# Patient Record
Sex: FEMALE | Race: WHITE
[De-identification: names, ages, dates, MRNs, and addresses within clinical notes are randomized per-mention and may not be internally consistent; named-entity substitution may affect disease eponyms.]

---

## 2018-06-11 ENCOUNTER — HOSPITAL ENCOUNTER (INPATIENT)
Dept: HOSPITAL 44 - SOUTH | Age: 83
LOS: 16 days | Discharge: HOME HEALTH | DRG: 566 | End: 2018-06-27
Attending: FAMILY MEDICINE | Admitting: FAMILY MEDICINE
Payer: MEDICARE

## 2018-06-11 VITALS — BODY MASS INDEX: 26.4 KG/M2

## 2018-06-11 DIAGNOSIS — I10: ICD-10-CM

## 2018-06-11 DIAGNOSIS — Z96.612: Primary | ICD-10-CM

## 2018-06-11 DIAGNOSIS — K21.9: ICD-10-CM

## 2018-06-11 PROCEDURE — 80053 COMPREHEN METABOLIC PANEL: CPT

## 2018-06-11 PROCEDURE — 36415 COLL VENOUS BLD VENIPUNCTURE: CPT

## 2018-06-11 PROCEDURE — 85025 COMPLETE CBC W/AUTO DIFF WBC: CPT

## 2018-06-11 RX ADMIN — FAMOTIDINE SCH MG: 20 TABLET, FILM COATED ORAL at 16:51

## 2018-06-11 RX ADMIN — PANTOPRAZOLE SODIUM SCH MG: 40 TABLET, DELAYED RELEASE ORAL at 16:51

## 2018-06-11 RX ADMIN — Medication SCH EACH: at 20:03

## 2018-06-11 RX ADMIN — GABAPENTIN SCH MG: 300 CAPSULE ORAL at 20:03

## 2018-06-12 LAB
BASOPHILS NFR BLD: 0.1 % (ref 0–1.5)
EGFR (AFRICAN): > 60
EGFR (NON-AFRICAN): > 60
EOSINOPHIL NFR BLD: 3.9 % (ref 0–6.8)
MCH RBC QN AUTO: 29 PG (ref 28–34)
MCV RBC AUTO: 90.4 FL (ref 80–100)
MONOCYTES %: 9.8 % (ref 0–11)
NEUTROPHILS #: 4.6 # K/UL (ref 1.4–7.7)

## 2018-06-12 RX ADMIN — PANTOPRAZOLE SODIUM SCH MG: 40 TABLET, DELAYED RELEASE ORAL at 05:51

## 2018-06-12 RX ADMIN — MULTIVITAMIN TABLET SCH EACH: TABLET at 09:27

## 2018-06-12 RX ADMIN — GABAPENTIN SCH MG: 300 CAPSULE ORAL at 20:00

## 2018-06-12 RX ADMIN — ALENDRONATE SODIUM SCH MG: 70 TABLET ORAL at 05:51

## 2018-06-12 RX ADMIN — LOSARTAN POTASSIUM SCH MG: 50 TABLET, FILM COATED ORAL at 09:26

## 2018-06-12 RX ADMIN — LORATADINE SCH MG: 10 TABLET ORAL at 09:26

## 2018-06-12 RX ADMIN — FAMOTIDINE SCH MG: 20 TABLET, FILM COATED ORAL at 05:51

## 2018-06-12 RX ADMIN — Medication SCH EACH: at 20:00

## 2018-06-12 RX ADMIN — Medication SCH EACH: at 09:27

## 2018-06-12 RX ADMIN — MIRABEGRON SCH MG: 25 TABLET, FILM COATED, EXTENDED RELEASE ORAL at 18:05

## 2018-06-12 RX ADMIN — ALBUTEROL SULFATE PRN PUFF: 90 AEROSOL, METERED RESPIRATORY (INHALATION) at 18:09

## 2018-06-12 RX ADMIN — ACETAMINOPHEN PRN MG: 325 TABLET, FILM COATED ORAL at 18:07

## 2018-06-13 RX ADMIN — LORATADINE SCH MG: 10 TABLET ORAL at 08:29

## 2018-06-13 RX ADMIN — Medication SCH EACH: at 08:30

## 2018-06-13 RX ADMIN — MIRABEGRON SCH MG: 25 TABLET, FILM COATED, EXTENDED RELEASE ORAL at 17:44

## 2018-06-13 RX ADMIN — PANTOPRAZOLE SODIUM SCH MG: 40 TABLET, DELAYED RELEASE ORAL at 05:41

## 2018-06-13 RX ADMIN — Medication SCH EACH: at 20:40

## 2018-06-13 RX ADMIN — GABAPENTIN SCH MG: 300 CAPSULE ORAL at 20:37

## 2018-06-13 RX ADMIN — MULTIVITAMIN TABLET SCH EACH: TABLET at 08:30

## 2018-06-13 RX ADMIN — FAMOTIDINE SCH MG: 20 TABLET, FILM COATED ORAL at 05:41

## 2018-06-13 RX ADMIN — LOSARTAN POTASSIUM SCH MG: 50 TABLET, FILM COATED ORAL at 08:28

## 2018-06-14 RX ADMIN — PANTOPRAZOLE SODIUM SCH MG: 40 TABLET, DELAYED RELEASE ORAL at 06:04

## 2018-06-14 RX ADMIN — GABAPENTIN SCH MG: 300 CAPSULE ORAL at 20:05

## 2018-06-14 RX ADMIN — Medication SCH EACH: at 20:21

## 2018-06-14 RX ADMIN — MIRABEGRON SCH MG: 25 TABLET, FILM COATED, EXTENDED RELEASE ORAL at 17:14

## 2018-06-14 RX ADMIN — FAMOTIDINE SCH MG: 20 TABLET, FILM COATED ORAL at 06:04

## 2018-06-14 RX ADMIN — Medication SCH EACH: at 08:44

## 2018-06-14 RX ADMIN — LORATADINE SCH MG: 10 TABLET ORAL at 17:47

## 2018-06-14 RX ADMIN — Medication SCH EACH: at 20:07

## 2018-06-14 RX ADMIN — MULTIVITAMIN TABLET SCH EACH: TABLET at 08:44

## 2018-06-14 RX ADMIN — LOSARTAN POTASSIUM SCH MG: 50 TABLET, FILM COATED ORAL at 08:42

## 2018-06-15 RX ADMIN — FAMOTIDINE SCH MG: 20 TABLET, FILM COATED ORAL at 06:07

## 2018-06-15 RX ADMIN — Medication SCH EACH: at 09:42

## 2018-06-15 RX ADMIN — MIRABEGRON SCH MG: 25 TABLET, FILM COATED, EXTENDED RELEASE ORAL at 18:01

## 2018-06-15 RX ADMIN — GABAPENTIN SCH MG: 300 CAPSULE ORAL at 20:14

## 2018-06-15 RX ADMIN — Medication SCH EACH: at 20:15

## 2018-06-15 RX ADMIN — MULTIVITAMIN TABLET SCH EACH: TABLET at 09:42

## 2018-06-15 RX ADMIN — PANTOPRAZOLE SODIUM SCH MG: 40 TABLET, DELAYED RELEASE ORAL at 06:07

## 2018-06-15 RX ADMIN — LOSARTAN POTASSIUM SCH MG: 50 TABLET, FILM COATED ORAL at 09:42

## 2018-06-15 RX ADMIN — LORATADINE SCH MG: 10 TABLET ORAL at 09:42

## 2018-06-16 RX ADMIN — LORATADINE SCH MG: 10 TABLET ORAL at 09:08

## 2018-06-16 RX ADMIN — FAMOTIDINE SCH MG: 20 TABLET, FILM COATED ORAL at 06:31

## 2018-06-16 RX ADMIN — MIRABEGRON SCH MG: 25 TABLET, FILM COATED, EXTENDED RELEASE ORAL at 17:01

## 2018-06-16 RX ADMIN — PANTOPRAZOLE SODIUM SCH MG: 40 TABLET, DELAYED RELEASE ORAL at 06:31

## 2018-06-16 RX ADMIN — Medication SCH EACH: at 09:09

## 2018-06-16 RX ADMIN — LOSARTAN POTASSIUM SCH MG: 50 TABLET, FILM COATED ORAL at 09:09

## 2018-06-16 RX ADMIN — MULTIVITAMIN TABLET SCH EACH: TABLET at 09:10

## 2018-06-18 RX ADMIN — LOSARTAN POTASSIUM SCH MG: 50 TABLET, FILM COATED ORAL at 11:26

## 2018-06-18 RX ADMIN — MULTIVITAMIN TABLET SCH EACH: TABLET at 11:26

## 2018-06-18 RX ADMIN — FAMOTIDINE SCH MG: 20 TABLET, FILM COATED ORAL at 11:27

## 2018-06-18 RX ADMIN — Medication SCH EACH: at 11:26

## 2018-06-18 RX ADMIN — LORATADINE SCH MG: 10 TABLET ORAL at 11:26

## 2018-06-18 RX ADMIN — GABAPENTIN SCH MG: 300 CAPSULE ORAL at 20:59

## 2018-06-18 RX ADMIN — GABAPENTIN SCH MG: 300 CAPSULE ORAL at 11:28

## 2018-06-18 RX ADMIN — ALENDRONATE SODIUM SCH MG: 70 TABLET ORAL at 11:23

## 2018-06-18 RX ADMIN — Medication SCH EACH: at 20:59

## 2018-06-18 RX ADMIN — GABAPENTIN SCH MG: 300 CAPSULE ORAL at 11:26

## 2018-06-18 RX ADMIN — PANTOPRAZOLE SODIUM SCH MG: 40 TABLET, DELAYED RELEASE ORAL at 11:27

## 2018-06-18 RX ADMIN — MIRABEGRON SCH MG: 25 TABLET, FILM COATED, EXTENDED RELEASE ORAL at 17:35

## 2018-06-18 RX ADMIN — MIRABEGRON SCH MG: 25 TABLET, FILM COATED, EXTENDED RELEASE ORAL at 11:26

## 2018-06-18 RX ADMIN — Medication SCH EACH: at 11:28

## 2018-06-19 RX ADMIN — LORATADINE SCH MG: 10 TABLET ORAL at 08:41

## 2018-06-19 RX ADMIN — GABAPENTIN SCH MG: 300 CAPSULE ORAL at 21:18

## 2018-06-19 RX ADMIN — PANTOPRAZOLE SODIUM SCH MG: 40 TABLET, DELAYED RELEASE ORAL at 06:30

## 2018-06-19 RX ADMIN — Medication SCH EACH: at 08:41

## 2018-06-19 RX ADMIN — MULTIVITAMIN TABLET SCH EACH: TABLET at 08:41

## 2018-06-19 RX ADMIN — ACETAMINOPHEN PRN MG: 325 TABLET, FILM COATED ORAL at 09:54

## 2018-06-19 RX ADMIN — Medication SCH EACH: at 21:18

## 2018-06-19 RX ADMIN — MIRABEGRON SCH MG: 25 TABLET, FILM COATED, EXTENDED RELEASE ORAL at 17:26

## 2018-06-19 RX ADMIN — LOSARTAN POTASSIUM SCH MG: 50 TABLET, FILM COATED ORAL at 08:41

## 2018-06-19 RX ADMIN — ACETAMINOPHEN PRN MG: 325 TABLET, FILM COATED ORAL at 21:18

## 2018-06-19 RX ADMIN — FAMOTIDINE SCH MG: 20 TABLET, FILM COATED ORAL at 06:30

## 2018-06-20 RX ADMIN — MIRABEGRON SCH MG: 25 TABLET, FILM COATED, EXTENDED RELEASE ORAL at 17:47

## 2018-06-20 RX ADMIN — LORATADINE SCH MG: 10 TABLET ORAL at 08:15

## 2018-06-20 RX ADMIN — Medication SCH EACH: at 08:15

## 2018-06-20 RX ADMIN — GABAPENTIN SCH MG: 300 CAPSULE ORAL at 20:15

## 2018-06-20 RX ADMIN — Medication SCH EACH: at 20:20

## 2018-06-20 RX ADMIN — PANTOPRAZOLE SODIUM SCH MG: 40 TABLET, DELAYED RELEASE ORAL at 06:15

## 2018-06-20 RX ADMIN — MULTIVITAMIN TABLET SCH EACH: TABLET at 08:15

## 2018-06-20 RX ADMIN — FAMOTIDINE SCH MG: 20 TABLET, FILM COATED ORAL at 06:15

## 2018-06-20 RX ADMIN — LOSARTAN POTASSIUM SCH MG: 50 TABLET, FILM COATED ORAL at 08:15

## 2018-06-20 NOTE — INPATIENT PROGRESS NOTE
Subjective





- Required Recertification Statement


I anticipate X number of days because-include discharge plan: 7





- Review of Systems


Events since last encounter: 





Claudia says that her pain is well controlled, and she is doing well in therapy.  

She denies any new c/o.  Her arm is in it's immobilizer with cooling pack in 

place. 


General: Denies: Chills


HEENT: Denies: Head Aches


Pulmonary: Cough.  Denies: Dyspnea


Cardiovascular: Denies: Chest Pain


Gastrointestinal: Denies: Nausea, Vomiting


Genitourinary: Denies: Dysuria


Musculoskeletal: Shoulder Pain (improving).  Denies: Neck Pain


Neurological: Denies: Weakness





Objective





- Exam


Vitals and I&O: 


 Vital Signs











Temp  98 F   06/19/18 20:22


 


Pulse  91 H  06/19/18 20:22


 


Resp  16   06/19/18 20:22


 


BP  138/93   06/19/18 20:22


 


Pulse Ox  99   06/19/18 20:22











 Intake & Output











 06/19/18 06/19/18 06/20/18





 11:59 23:59 11:59


 


Intake Total 240 600 


 


Balance 240 600 


 


Intake:   


 


  Oral 240 600 


 


Other:   


 


  Voiding Method Toilet Toilet 


 


  # Bowel Movements 0 0 0














General: Alert, Oriented to Person, Oriented to Place, Oriented to Time


HEENT: Atraumatic, PERRLA, EOMI


Neck: Supple, No JVD, No thyromegaly


Lungs: Clear to auscultation


Cardiovascular: Regular rate


Abdomen: Normal bowel sounds


Extremities: No clubbing, No cyanosis, No edema, Other (left shoulder 

immobilizer is in place)


Skin: Normal, Pink, Warm, Dry


Neurological: Normal speech


Psych/Mental Status: Mental status NL





- Results


Results: 


 Laboratory Results











WBC  6.40 K/ul (4.00-12.00)   06/12/18  06:40    


 


RBC  4.05 M/ul (3.90-5.20)   06/12/18  06:40    


 


Hgb  11.8 g/dL (12.0-16.0)  L  06/12/18  06:40    


 


Hct  36.6 % (34.5-46.5)   06/12/18  06:40    


 


MCV  90.4 fl (80.0-100.0)   06/12/18  06:40    


 


MCH  29.0 pg (28.0-34.0)   06/12/18  06:40    


 


MCHC  32.1 g/dL (30.0-36.0)   06/12/18  06:40    


 


RDW  13.2 % (11.3-14.3)   06/12/18  06:40    


 


Plt Count  153 K/mm3 (130-400)   06/12/18  06:40    


 


Neut % (Auto)  71.2 % (39.0-79.0)   06/12/18  06:40    


 


Lymph % (Auto)  12.8 % (16.0-50.0)  L  06/12/18  06:40    


 


Mono % (Auto)  9.8 % (0.0-11.0)   06/12/18  06:40    


 


Eos % (Auto)  3.9 % (0.0-6.8)   06/12/18  06:40    


 


Baso % (Auto)  0.1  (0.0-1.5)   06/12/18  06:40    


 


Neut # (Auto)  4.6 # k/uL (1.4-7.7)   06/12/18  06:40    


 


Lymph # (Auto)  0.8 # k/uL (0.6-4.0)   06/12/18  06:40    


 


Mono # (Auto)  0.6 # k/uL (0.0-0.9)   06/12/18  06:40    


 


Eos # (Auto)  0.2 # k/uL (0.0-0.6)   06/12/18  06:40    


 


Baso # (Auto)  0.0 # k/uL (0.0-0.5)   06/12/18  06:40    


 


Reactive Lymphs %  2.1 % (0.0-5.0)   06/12/18  06:40    


 


Reactive Lymphs #  0.1 # k/uL (0.0-0.8)   06/12/18  06:40    


 


Sodium  137 mmol/L (136-145)   06/12/18  06:40    


 


Potassium  4.6 mmol/L (3.5-5.1)   06/12/18  06:40    


 


Chloride  104 mmol/L ()   06/12/18  06:40    


 


Carbon Dioxide  28 mmol/L (22-30)   06/12/18  06:40    


 


BUN  23 mg/dL (7-17)  H  06/12/18  06:40    


 


Creatinine  0.70 mg/dL (0.52-1.04)   06/12/18  06:40    


 


Estimated Creat Clear  71   06/12/18  06:40    


 


Est GFR ( Amer)  > 60  (60-)  06/12/18  06:40    


 


Est GFR (Non-Af Amer)  > 60  (60-)  06/12/18  06:40    


 


Glucose  98 mg/dL ()   06/12/18  06:40    


 


Calcium  9.3 mg/dL (8.4-10.2)   06/12/18  06:40    


 


Total Bilirubin  1.1 mg/dL (0.2-1.3)   06/12/18  06:40    


 


AST  21 U/L (15-46)   06/12/18  06:40    


 


ALT  24 U/L (13-69)   06/12/18  06:40    


 


Alkaline Phosphatase  81 U/L ()   06/12/18  06:40    


 


Total Protein  6.0 g/dL (6.3-8.2)  L  06/12/18  06:40    


 


Albumin  3.2 g/dL (3.5-5.0)  L  06/12/18  06:40    

















Assessment/Plan





- Assessment/Plan


(1) Left shoulder arthroplasty


Status: Acute   Current Visit: Yes   


Assessment: 


Healing well


Plan: 


Continue PT/OT








(2) GERD


Status: Acute   Current Visit: Yes

## 2018-06-21 RX ADMIN — GABAPENTIN SCH MG: 300 CAPSULE ORAL at 20:31

## 2018-06-21 RX ADMIN — FAMOTIDINE SCH MG: 20 TABLET, FILM COATED ORAL at 06:33

## 2018-06-21 RX ADMIN — Medication SCH EACH: at 08:42

## 2018-06-21 RX ADMIN — LORATADINE SCH MG: 10 TABLET ORAL at 08:42

## 2018-06-21 RX ADMIN — MIRABEGRON SCH MG: 25 TABLET, FILM COATED, EXTENDED RELEASE ORAL at 17:44

## 2018-06-21 RX ADMIN — MULTIVITAMIN TABLET SCH EACH: TABLET at 08:42

## 2018-06-21 RX ADMIN — LOSARTAN POTASSIUM SCH MG: 50 TABLET, FILM COATED ORAL at 08:42

## 2018-06-21 RX ADMIN — PANTOPRAZOLE SODIUM SCH MG: 40 TABLET, DELAYED RELEASE ORAL at 06:33

## 2018-06-21 RX ADMIN — Medication SCH EACH: at 20:31

## 2018-06-22 RX ADMIN — Medication SCH EACH: at 08:33

## 2018-06-22 RX ADMIN — LORATADINE SCH MG: 10 TABLET ORAL at 08:33

## 2018-06-22 RX ADMIN — MIRABEGRON SCH MG: 25 TABLET, FILM COATED, EXTENDED RELEASE ORAL at 17:35

## 2018-06-22 RX ADMIN — GABAPENTIN SCH MG: 300 CAPSULE ORAL at 20:16

## 2018-06-22 RX ADMIN — FAMOTIDINE SCH MG: 20 TABLET, FILM COATED ORAL at 06:51

## 2018-06-22 RX ADMIN — LOSARTAN POTASSIUM SCH MG: 50 TABLET, FILM COATED ORAL at 08:33

## 2018-06-22 RX ADMIN — PANTOPRAZOLE SODIUM SCH MG: 40 TABLET, DELAYED RELEASE ORAL at 06:51

## 2018-06-22 RX ADMIN — Medication SCH EACH: at 20:16

## 2018-06-22 RX ADMIN — MULTIVITAMIN TABLET SCH EACH: TABLET at 08:33

## 2018-06-23 RX ADMIN — Medication SCH EACH: at 08:57

## 2018-06-23 RX ADMIN — MULTIVITAMIN TABLET SCH EACH: TABLET at 08:57

## 2018-06-23 RX ADMIN — LORATADINE SCH MG: 10 TABLET ORAL at 08:57

## 2018-06-23 RX ADMIN — LOSARTAN POTASSIUM SCH MG: 50 TABLET, FILM COATED ORAL at 08:57

## 2018-06-23 RX ADMIN — FAMOTIDINE SCH MG: 20 TABLET, FILM COATED ORAL at 06:03

## 2018-06-23 RX ADMIN — ACETAMINOPHEN PRN MG: 325 TABLET, FILM COATED ORAL at 08:57

## 2018-06-23 RX ADMIN — GABAPENTIN SCH MG: 300 CAPSULE ORAL at 20:36

## 2018-06-23 RX ADMIN — MIRABEGRON SCH MG: 25 TABLET, FILM COATED, EXTENDED RELEASE ORAL at 17:57

## 2018-06-23 RX ADMIN — Medication SCH EACH: at 20:36

## 2018-06-23 RX ADMIN — PANTOPRAZOLE SODIUM SCH MG: 40 TABLET, DELAYED RELEASE ORAL at 06:03

## 2018-06-24 RX ADMIN — PANTOPRAZOLE SODIUM SCH MG: 40 TABLET, DELAYED RELEASE ORAL at 06:13

## 2018-06-24 RX ADMIN — Medication SCH EACH: at 21:30

## 2018-06-24 RX ADMIN — Medication SCH EACH: at 09:01

## 2018-06-24 RX ADMIN — LORATADINE SCH MG: 10 TABLET ORAL at 09:01

## 2018-06-24 RX ADMIN — MULTIVITAMIN TABLET SCH EACH: TABLET at 09:01

## 2018-06-24 RX ADMIN — GABAPENTIN SCH MG: 300 CAPSULE ORAL at 21:30

## 2018-06-24 RX ADMIN — MIRABEGRON SCH MG: 25 TABLET, FILM COATED, EXTENDED RELEASE ORAL at 17:41

## 2018-06-24 RX ADMIN — FAMOTIDINE SCH MG: 20 TABLET, FILM COATED ORAL at 06:13

## 2018-06-24 RX ADMIN — LOSARTAN POTASSIUM SCH MG: 50 TABLET, FILM COATED ORAL at 09:01

## 2018-06-25 RX ADMIN — ALENDRONATE SODIUM SCH MG: 70 TABLET ORAL at 05:30

## 2018-06-25 RX ADMIN — MIRABEGRON SCH MG: 25 TABLET, FILM COATED, EXTENDED RELEASE ORAL at 17:48

## 2018-06-25 RX ADMIN — Medication SCH EACH: at 20:43

## 2018-06-25 RX ADMIN — GABAPENTIN SCH MG: 300 CAPSULE ORAL at 20:43

## 2018-06-25 RX ADMIN — PANTOPRAZOLE SODIUM SCH MG: 40 TABLET, DELAYED RELEASE ORAL at 06:30

## 2018-06-25 RX ADMIN — LOSARTAN POTASSIUM SCH MG: 50 TABLET, FILM COATED ORAL at 09:32

## 2018-06-25 RX ADMIN — FAMOTIDINE SCH MG: 20 TABLET, FILM COATED ORAL at 06:30

## 2018-06-25 RX ADMIN — LORATADINE SCH MG: 10 TABLET ORAL at 09:32

## 2018-06-25 RX ADMIN — MULTIVITAMIN TABLET SCH EACH: TABLET at 09:32

## 2018-06-25 RX ADMIN — ALBUTEROL SULFATE PRN PUFF: 90 AEROSOL, METERED RESPIRATORY (INHALATION) at 09:48

## 2018-06-25 RX ADMIN — Medication SCH EACH: at 09:32

## 2018-06-26 RX ADMIN — PANTOPRAZOLE SODIUM SCH MG: 40 TABLET, DELAYED RELEASE ORAL at 05:43

## 2018-06-26 RX ADMIN — LORATADINE SCH MG: 10 TABLET ORAL at 08:19

## 2018-06-26 RX ADMIN — MULTIVITAMIN TABLET SCH EACH: TABLET at 08:18

## 2018-06-26 RX ADMIN — FAMOTIDINE SCH MG: 20 TABLET, FILM COATED ORAL at 05:43

## 2018-06-26 RX ADMIN — Medication SCH EACH: at 08:19

## 2018-06-26 RX ADMIN — LOSARTAN POTASSIUM SCH MG: 50 TABLET, FILM COATED ORAL at 08:18

## 2018-06-26 RX ADMIN — GABAPENTIN SCH MG: 300 CAPSULE ORAL at 20:38

## 2018-06-26 RX ADMIN — Medication SCH EACH: at 20:38

## 2018-06-26 RX ADMIN — MIRABEGRON SCH MG: 25 TABLET, FILM COATED, EXTENDED RELEASE ORAL at 17:51

## 2018-06-27 VITALS — SYSTOLIC BLOOD PRESSURE: 108 MMHG | DIASTOLIC BLOOD PRESSURE: 67 MMHG

## 2018-06-27 RX ADMIN — FAMOTIDINE SCH MG: 20 TABLET, FILM COATED ORAL at 06:05

## 2018-06-27 RX ADMIN — LOSARTAN POTASSIUM SCH MG: 50 TABLET, FILM COATED ORAL at 09:02

## 2018-06-27 RX ADMIN — PANTOPRAZOLE SODIUM SCH MG: 40 TABLET, DELAYED RELEASE ORAL at 06:05

## 2018-06-27 RX ADMIN — Medication SCH EACH: at 09:03

## 2018-06-27 RX ADMIN — LORATADINE SCH MG: 10 TABLET ORAL at 09:02

## 2018-06-27 RX ADMIN — MULTIVITAMIN TABLET SCH EACH: TABLET at 09:03

## 2018-07-05 NOTE — DISCHARGE SUMMARY
DATE OF ADMISSION:

June 11, 2018



DATE OF DISCHARGE: 

June 27, 2018

 

DIAGNOSES ON THIS HOSPITALIZATION:

1.   Status post left shoulder arthroplasty.

2.   Gastroesophageal reflux disease. 

3.   Osteoarthritis.



SUMMARIZATION OF ADMISSION HISTORY AND PHYSICAL:

This is an 87-year-old female who had a left hemiarthroplasty done 4 days prior 
to admission by Dr. Branden Castillo.   Postoperative course had been uncomplicated 
and she came to us for therapy with a plan for her to be discharged home where 
she lives with her daughter on discharge. 



HOSPITAL COURSE: 

She was admitted.  She actually did remarkably well with her therapy.  She was 
discharged to home on the following medications. 



MEDICATIONS ON DISCHARGE: 

1.   Tylenol 650 mg every 6 hours p.r.n. pain. 

2.   Fosamax 70 mg weekly. 

3.   Calcium with vitamin D 500 mg p.o. b.i.d.

4.   Ranitidine 150 mg p.o. daily. 

5.   Neurontin 300 mg p.o. at bedtime. 

6.   Hydrocodone 5/325 mg 2 every 4 hours p.r.n. pain. 

7.   Fexofenadine 180 mg daily.

8.   Losartan 25 mg daily. 

9.   Multivitamin 1 daily.

10. Omeprazole 40 mg daily. 



DISCHARGE INSTRUCTIONS: 

Follow up with Dr. Castillo as scheduled and her PCP in 2 weeks. 
MTDD